# Patient Record
Sex: MALE | Race: WHITE | NOT HISPANIC OR LATINO
[De-identification: names, ages, dates, MRNs, and addresses within clinical notes are randomized per-mention and may not be internally consistent; named-entity substitution may affect disease eponyms.]

---

## 2021-10-18 ENCOUNTER — NON-APPOINTMENT (OUTPATIENT)
Age: 76
End: 2021-10-18

## 2021-10-19 ENCOUNTER — NON-APPOINTMENT (OUTPATIENT)
Age: 76
End: 2021-10-19

## 2021-10-25 ENCOUNTER — EMERGENCY (EMERGENCY)
Facility: HOSPITAL | Age: 76
LOS: 1 days | Discharge: HOME | End: 2021-10-25
Attending: INTERNAL MEDICINE | Admitting: INTERNAL MEDICINE
Payer: COMMERCIAL

## 2021-10-25 ENCOUNTER — APPOINTMENT (OUTPATIENT)
Dept: FAMILY MEDICINE | Facility: CLINIC | Age: 76
End: 2021-10-25
Payer: MEDICARE

## 2021-10-25 VITALS
HEIGHT: 67.5 IN | TEMPERATURE: 96.9 F | OXYGEN SATURATION: 98 % | RESPIRATION RATE: 16 BRPM | DIASTOLIC BLOOD PRESSURE: 90 MMHG | BODY MASS INDEX: 23.73 KG/M2 | SYSTOLIC BLOOD PRESSURE: 130 MMHG | HEART RATE: 68 BPM | WEIGHT: 153 LBS

## 2021-10-25 VITALS
TEMPERATURE: 98 F | HEART RATE: 59 BPM | SYSTOLIC BLOOD PRESSURE: 169 MMHG | RESPIRATION RATE: 17 BRPM | DIASTOLIC BLOOD PRESSURE: 108 MMHG | WEIGHT: 153 LBS | OXYGEN SATURATION: 99 % | HEIGHT: 67 IN

## 2021-10-25 VITALS
OXYGEN SATURATION: 98 % | SYSTOLIC BLOOD PRESSURE: 158 MMHG | HEART RATE: 57 BPM | RESPIRATION RATE: 16 BRPM | DIASTOLIC BLOOD PRESSURE: 85 MMHG

## 2021-10-25 DIAGNOSIS — R63.4 ABNORMAL WEIGHT LOSS: ICD-10-CM

## 2021-10-25 DIAGNOSIS — Z98.890 OTHER SPECIFIED POSTPROCEDURAL STATES: ICD-10-CM

## 2021-10-25 LAB
ACETONE SERPL-MCNC: ABNORMAL
ALBUMIN SERPL ELPH-MCNC: 4 G/DL — SIGNIFICANT CHANGE UP (ref 3.3–5)
ALP SERPL-CCNC: 109 U/L — SIGNIFICANT CHANGE UP (ref 40–120)
ALT FLD-CCNC: 69 U/L — HIGH (ref 10–45)
ANION GAP SERPL CALC-SCNC: 13 MMOL/L — SIGNIFICANT CHANGE UP (ref 5–17)
APPEARANCE UR: CLEAR — SIGNIFICANT CHANGE UP
AST SERPL-CCNC: 47 U/L — HIGH (ref 10–40)
BACTERIA # UR AUTO: NEGATIVE /HPF — SIGNIFICANT CHANGE UP
BASE EXCESS BLDV CALC-SCNC: -0.6 MMOL/L — SIGNIFICANT CHANGE UP (ref -2–3)
BASOPHILS # BLD AUTO: 0.02 K/UL — SIGNIFICANT CHANGE UP (ref 0–0.2)
BASOPHILS NFR BLD AUTO: 0.3 % — SIGNIFICANT CHANGE UP (ref 0–2)
BILIRUB SERPL-MCNC: 0.9 MG/DL — SIGNIFICANT CHANGE UP (ref 0.2–1.2)
BILIRUB UR-MCNC: NEGATIVE — SIGNIFICANT CHANGE UP
BUN SERPL-MCNC: 22 MG/DL — SIGNIFICANT CHANGE UP (ref 7–23)
CALCIUM SERPL-MCNC: 9.6 MG/DL — SIGNIFICANT CHANGE UP (ref 8.4–10.5)
CHLORIDE SERPL-SCNC: 90 MMOL/L — LOW (ref 96–108)
CO2 BLDV-SCNC: 27 MMOL/L — HIGH (ref 22–26)
CO2 SERPL-SCNC: 25 MMOL/L — SIGNIFICANT CHANGE UP (ref 22–31)
COLOR SPEC: YELLOW — SIGNIFICANT CHANGE UP
CREAT SERPL-MCNC: 1.36 MG/DL — HIGH (ref 0.5–1.3)
DIFF PNL FLD: NEGATIVE — SIGNIFICANT CHANGE UP
EOSINOPHIL # BLD AUTO: 0.05 K/UL — SIGNIFICANT CHANGE UP (ref 0–0.5)
EOSINOPHIL NFR BLD AUTO: 0.9 % — SIGNIFICANT CHANGE UP (ref 0–6)
EPI CELLS # UR: SIGNIFICANT CHANGE UP
GAS PNL BLDV: SIGNIFICANT CHANGE UP
GLUCOSE BLDC GLUCOMTR-MCNC: 360 MG/DL — HIGH (ref 70–99)
GLUCOSE BLDC GLUCOMTR-MCNC: 425 MG/DL — HIGH (ref 70–99)
GLUCOSE BLDC GLUCOMTR-MCNC: 445 MG/DL — HIGH (ref 70–99)
GLUCOSE SERPL-MCNC: 427 MG/DL — HIGH (ref 70–99)
GLUCOSE UR QL: 1000 MG/DL
HCO3 BLDV-SCNC: 25 MMOL/L — SIGNIFICANT CHANGE UP (ref 22–29)
HCT VFR BLD CALC: 40.6 % — SIGNIFICANT CHANGE UP (ref 39–50)
HGB BLD-MCNC: 14.8 G/DL — SIGNIFICANT CHANGE UP (ref 13–17)
HOROWITZ INDEX BLDV+IHG-RTO: 21 — SIGNIFICANT CHANGE UP
IMM GRANULOCYTES NFR BLD AUTO: 0.5 % — SIGNIFICANT CHANGE UP (ref 0–1.5)
KETONES UR-MCNC: ABNORMAL
LEUKOCYTE ESTERASE UR-ACNC: NEGATIVE — SIGNIFICANT CHANGE UP
LYMPHOCYTES # BLD AUTO: 1.3 K/UL — SIGNIFICANT CHANGE UP (ref 1–3.3)
LYMPHOCYTES # BLD AUTO: 22.1 % — SIGNIFICANT CHANGE UP (ref 13–44)
MAGNESIUM SERPL-MCNC: 2.1 MG/DL — SIGNIFICANT CHANGE UP (ref 1.6–2.6)
MCHC RBC-ENTMCNC: 31.7 PG — SIGNIFICANT CHANGE UP (ref 27–34)
MCHC RBC-ENTMCNC: 36.5 GM/DL — HIGH (ref 32–36)
MCV RBC AUTO: 86.9 FL — SIGNIFICANT CHANGE UP (ref 80–100)
MONOCYTES # BLD AUTO: 0.45 K/UL — SIGNIFICANT CHANGE UP (ref 0–0.9)
MONOCYTES NFR BLD AUTO: 7.7 % — SIGNIFICANT CHANGE UP (ref 2–14)
NEUTROPHILS # BLD AUTO: 4.02 K/UL — SIGNIFICANT CHANGE UP (ref 1.8–7.4)
NEUTROPHILS NFR BLD AUTO: 68.5 % — SIGNIFICANT CHANGE UP (ref 43–77)
NITRITE UR-MCNC: NEGATIVE — SIGNIFICANT CHANGE UP
NRBC # BLD: 0 /100 WBCS — SIGNIFICANT CHANGE UP (ref 0–0)
PCO2 BLDV: 48 MMHG — SIGNIFICANT CHANGE UP (ref 42–55)
PH BLDV: 7.33 — SIGNIFICANT CHANGE UP (ref 7.32–7.43)
PH UR: 6 — SIGNIFICANT CHANGE UP (ref 5–8)
PHOSPHATE SERPL-MCNC: 4.4 MG/DL — SIGNIFICANT CHANGE UP (ref 2.5–4.5)
PLATELET # BLD AUTO: 202 K/UL — SIGNIFICANT CHANGE UP (ref 150–400)
PO2 BLDV: <35 MMHG — LOW (ref 25–45)
POTASSIUM SERPL-MCNC: 4.6 MMOL/L — SIGNIFICANT CHANGE UP (ref 3.5–5.3)
POTASSIUM SERPL-SCNC: 4.6 MMOL/L — SIGNIFICANT CHANGE UP (ref 3.5–5.3)
PROT SERPL-MCNC: 8.2 G/DL — SIGNIFICANT CHANGE UP (ref 6–8.3)
PROT UR-MCNC: 15
RBC # BLD: 4.67 M/UL — SIGNIFICANT CHANGE UP (ref 4.2–5.8)
RBC # FLD: 12.2 % — SIGNIFICANT CHANGE UP (ref 10.3–14.5)
RBC CASTS # UR COMP ASSIST: NEGATIVE /HPF — SIGNIFICANT CHANGE UP (ref 0–4)
SAO2 % BLDV: 17.1 % — LOW (ref 67–88)
SODIUM SERPL-SCNC: 128 MMOL/L — LOW (ref 135–145)
SP GR SPEC: 1.01 — SIGNIFICANT CHANGE UP (ref 1.01–1.02)
UROBILINOGEN FLD QL: NEGATIVE — SIGNIFICANT CHANGE UP
WBC # BLD: 5.87 K/UL — SIGNIFICANT CHANGE UP (ref 3.8–10.5)
WBC # FLD AUTO: 5.87 K/UL — SIGNIFICANT CHANGE UP (ref 3.8–10.5)
WBC UR QL: NEGATIVE /HPF — SIGNIFICANT CHANGE UP (ref 0–5)

## 2021-10-25 PROCEDURE — 80053 COMPREHEN METABOLIC PANEL: CPT

## 2021-10-25 PROCEDURE — 71045 X-RAY EXAM CHEST 1 VIEW: CPT | Mod: 26

## 2021-10-25 PROCEDURE — 96361 HYDRATE IV INFUSION ADD-ON: CPT

## 2021-10-25 PROCEDURE — 83735 ASSAY OF MAGNESIUM: CPT

## 2021-10-25 PROCEDURE — 82947 ASSAY GLUCOSE BLOOD QUANT: CPT | Mod: QW

## 2021-10-25 PROCEDURE — 36415 COLL VENOUS BLD VENIPUNCTURE: CPT

## 2021-10-25 PROCEDURE — 85025 COMPLETE CBC W/AUTO DIFF WBC: CPT

## 2021-10-25 PROCEDURE — 82962 GLUCOSE BLOOD TEST: CPT

## 2021-10-25 PROCEDURE — 82803 BLOOD GASES ANY COMBINATION: CPT

## 2021-10-25 PROCEDURE — 99283 EMERGENCY DEPT VISIT LOW MDM: CPT | Mod: 25

## 2021-10-25 PROCEDURE — 99284 EMERGENCY DEPT VISIT MOD MDM: CPT

## 2021-10-25 PROCEDURE — 84100 ASSAY OF PHOSPHORUS: CPT

## 2021-10-25 PROCEDURE — 71045 X-RAY EXAM CHEST 1 VIEW: CPT

## 2021-10-25 PROCEDURE — 93010 ELECTROCARDIOGRAM REPORT: CPT

## 2021-10-25 PROCEDURE — 81003 URINALYSIS AUTO W/O SCOPE: CPT | Mod: QW

## 2021-10-25 PROCEDURE — 83036 HEMOGLOBIN GLYCOSYLATED A1C: CPT

## 2021-10-25 PROCEDURE — 82009 KETONE BODYS QUAL: CPT

## 2021-10-25 PROCEDURE — 99205 OFFICE O/P NEW HI 60 MIN: CPT | Mod: 25

## 2021-10-25 PROCEDURE — 81001 URINALYSIS AUTO W/SCOPE: CPT

## 2021-10-25 PROCEDURE — 96360 HYDRATION IV INFUSION INIT: CPT

## 2021-10-25 PROCEDURE — 93005 ELECTROCARDIOGRAM TRACING: CPT

## 2021-10-25 PROCEDURE — 83036 HEMOGLOBIN GLYCOSYLATED A1C: CPT | Mod: QW

## 2021-10-25 RX ORDER — SODIUM CHLORIDE 9 MG/ML
1000 INJECTION INTRAMUSCULAR; INTRAVENOUS; SUBCUTANEOUS ONCE
Refills: 0 | Status: COMPLETED | OUTPATIENT
Start: 2021-10-25 | End: 2021-10-25

## 2021-10-25 RX ORDER — INSULIN HUMAN 100 [IU]/ML
100 INJECTION, SOLUTION PARENTERAL ONCE
Qty: 1 | Refills: 0 | Status: DISCONTINUED | COMMUNITY
Start: 2021-10-25 | End: 2021-10-25

## 2021-10-25 RX ORDER — INSULIN HUMAN 100 [IU]/ML
5 INJECTION, SOLUTION SUBCUTANEOUS ONCE
Refills: 0 | Status: COMPLETED | OUTPATIENT
Start: 2021-10-25 | End: 2021-10-25

## 2021-10-25 RX ADMIN — SODIUM CHLORIDE 1000 MILLILITER(S): 9 INJECTION INTRAMUSCULAR; INTRAVENOUS; SUBCUTANEOUS at 14:28

## 2021-10-25 RX ADMIN — SODIUM CHLORIDE 1000 MILLILITER(S): 9 INJECTION INTRAMUSCULAR; INTRAVENOUS; SUBCUTANEOUS at 15:19

## 2021-10-25 RX ADMIN — SODIUM CHLORIDE 1000 MILLILITER(S): 9 INJECTION INTRAMUSCULAR; INTRAVENOUS; SUBCUTANEOUS at 16:24

## 2021-10-25 RX ADMIN — SODIUM CHLORIDE 1000 MILLILITER(S): 9 INJECTION INTRAMUSCULAR; INTRAVENOUS; SUBCUTANEOUS at 17:39

## 2021-10-25 RX ADMIN — INSULIN HUMAN 5 UNIT(S): 100 INJECTION, SOLUTION SUBCUTANEOUS at 17:39

## 2021-10-25 RX ADMIN — INSULIN HUMAN 5 UNIT(S): 100 INJECTION, SOLUTION SUBCUTANEOUS at 15:20

## 2021-10-25 NOTE — ASSESSMENT
[FreeTextEntry1] : 77 yo M presents to establish care and has new diagnosis of diabetes. \par \par #new diagnosed diabetes\par reported increased urinary frequency and DM in family \par Glucose fingerstick in clinic was 464\par POCT A1C was over 14%\par Will hydrate, and repeat fingerstick \par seen by diabetes educator and kentrell sensor was placed to monitor glucose \par will initiate insulin -10 units nightly and add 2 units daily until fasting glucose below 150\par Sent blood work to check kidney level and pending results will initiate metformin 500 BID and uptitrate as tolerated\par insulin administration teaching was provided by diabetes educator\par \par UPDATE: - it was elevated again at 453, and ketones found in urine\par will need monitoring to assess potassium as well. \par Will send patient to ED, and patient is amenable to plan for closer monitoring.\par \par #reactive depression\par wife is in hospice care a home\par PHQ9 score 14\par feels safe, no SI\par SW referral placed

## 2021-10-25 NOTE — HEALTH RISK ASSESSMENT
[No] : In the past 12 months have you used drugs other than those required for medical reasons? No [3] : 2) Feeling down, depressed, or hopeless for nearly every day (3) [[id number]] : 6

## 2021-10-25 NOTE — ED PROVIDER NOTE - CROS ED ENDOCRINE ALL NEG
----- Message from Myesha Robles MD sent at 3/12/2018 12:48 PM CDT -----  To keep FUV scheduled for tomorrow (he is way overdue). Increase synthroid to 100mcg daily, must recheck TSH in 12 weeks. MAT   - - -

## 2021-10-25 NOTE — ED PROVIDER NOTE - OBJECTIVE STATEMENT
75 y/o M no pmh [facility] from [facility] for 's and HGA1C 14. States polyuria and polydipsia. Has not seen a doctor in 2 years. His identical twin brother does have DM and is on insulin and oral meds. Denies fever, chills, chest pain, shortness of breath, ab pain, n/v/d, weakness, numbness, tingling.  Denies smoking/ ETOH.

## 2021-10-25 NOTE — ED PROVIDER NOTE - ATTENDING CONTRIBUTION TO CARE
75 y/o M no pmh sent in from [facility] for 's and HGA1C 14. States polyuria and polydipsia. Has not seen a doctor in 2 years. His identical twin brother does have DM and is on insulin and oral meds. Denies fever, chills, chest pain, shortness of breath, ab pain, n/v/d, weakness, numbness, tingling.  Denies smoking/ ETOH.   Plan: Will obtain DKA work up, UA, give fluids, insulin and likely admit  trace serum acetones, glucose  427, bicarb 13, ph 7.33  bun/cr 22/1.39   Dr. Berrios:  I have reviewed and discussed with the PA/ resident the case specifics, including the history, physical assessment, evaluation, conclusion, laboratory results, and medical plan. I agree with the contents, and conclusions. I have personally examined, and interviewed the patient. 75 y/o M no pmh sent in from [facility] for 's and HGA1C 14. States polyuria and polydipsia. Has not seen a doctor in 2 years. His identical twin brother does have DM and is on insulin and oral meds. Denies fever, chills, chest pain, shortness of breath, ab pain, n/v/d, weakness, numbness, tingling.  Denies smoking/ ETOH.   Plan: Will obtain DKA work up, UA, give fluids, insulin and likely admit  trace serum acetones, glucose  427, bicarb 13, ph 7.33  bun/cr 22/1.39    Long acting insulin Basaglar already sent to pharm by pmd   appoint  for dr ambrosio endocrine aLREADY MADE  Dr. Berrios:  I have reviewed and discussed with the PA/ resident the case specifics, including the history, physical assessment, evaluation, conclusion, laboratory results, and medical plan. I agree with the contents, and conclusions. I have personally examined, and interviewed the patient.

## 2021-10-25 NOTE — ED PROVIDER NOTE - CARE PROVIDER_API CALL
Chris Reece)  EndocrinologyMetabDiabetes  [address], [address]  [place] [address]  Phone: [phone]  Fax: [phone]  Follow Up Time:

## 2021-10-25 NOTE — ED ADULT TRIAGE NOTE - CHIEF COMPLAINT QUOTE
Pt from [facility], brought in for elevated  mg/dl at 1230 pm Pt from [facility], brought in for elevated  mg/dl at 1230 pm, ISAR negative

## 2021-10-25 NOTE — ED PROVIDER NOTE - NSFOLLOWUPINSTRUCTIONS_ED_ALL_ED_FT
Follow up with Dr. Reece within the week  Monitor your blood glucose at home before meals and at bedtime and record in a log book  Take Metformin 500mg 1 tab 2x/day   Take Glyburide 2.5mg 1 tab 2x/day  Take the Insulin at night as instructed by Dr. Reece who already sent it to pharmacy.   Worsening, continued or ANY new concerning symptoms return to the emergency department.     Diabetes Mellitus Action Plan    Following a diabetes action plan is a way for you to manage your diabetes (diabetes mellitus) symptoms. The plan is color-coded to help you understand what actions you need to take based on any symptoms you are having.  •If you have symptoms in the red zone, you need medical care right away.      •If you have symptoms in the yellow zone, you are having problems.      •If you have symptoms in the green zone, you are doing well.      Learning about and understanding diabetes can take time. Follow the plan that you develop with your health care provider. Know the target range for your blood sugar (glucose) level, and review your treatment plan with your health care provider at each visit.    The target range for my blood sugar level is __________________________ mg/dL.      Red zone  Get medical help right away if you have any of the following symptoms:  •A blood sugar test result that is below 54 mg/dL (3 mmol/L).      •A blood sugar test result that is at or above 240 mg/dL (13.3 mmol/L) for 2 days in a row.      •Confusion or trouble thinking clearly.      •Difficulty breathing.      •Sickness or a fever for 2 or more days that is not getting better.      •Moderate or large ketone levels in your urine.      •Feeling tired or having no energy.      If you have any red zone symptoms, do not wait to see if the symptoms will go away. Get medical help right away. Call your local emergency services (911 in the [place] Do not drive yourself to the hospital.    If you have severely low blood sugar (severe hypoglycemia) and you cannot eat or drink, you may need glucagon. Make sure a family member or close friend knows how to check your blood sugar and how to give you glucagon. You may need to be treated in a hospital for this condition.      Yellow zone  If you have any of the following symptoms, your diabetes is not under control and you may need to make some changes:  •A blood sugar test result that is at or above 240 mg/dL (13.3 mmol/L) for 2 days in a row.      •Blood sugar test results that are below 70 mg/dL (3.9 mmol/L).    •Other symptoms of hypoglycemia, such as:  •Shaking or feeling light-headed.      •Confusion or irritability.      •Feeling hungry.      •Having a fast heartbeat.      If you have any yellow zone symptoms:•Treat your hypoglycemia by eating or drinking 15 grams of a rapid-acting carbohydrate. Follow the 15:15 rule:•Take 15 grams of a rapid-acting carbohydrate, such as:  •1 tube of glucose gel.      •4 glucose pills.      •4 oz (120 mL) of fruit juice.      •4 oz (120 mL) of regular (not diet) soda.        •Check your blood sugar 15 minutes after you take the carbohydrate.      •If the repeat blood sugar test is still at or below 70 mg/dL (3.9 mmol/L), take 15 grams of a carbohydrate again.      •If your blood sugar does not increase above 70 mg/dL (3.9 mmol/L) after 3 tries, get medical help right away.      •After your blood sugar returns to normal, eat a meal or a snack within 1 hour.        •Keep taking your daily medicines as told by your health care provider.    •Check your blood sugar more often than you normally would.  •Write down your results.      •Call your health care provider if you have trouble keeping your blood sugar in your target range.          Green zone  These signs mean you are doing well and you can continue what you are doing to manage your diabetes:  •Your blood sugar is within your personal target range. For most people, a blood sugar level before a meal (preprandial) should be 80–130 mg/dL (4.4–7.2 mmol/L).      •You feel well, and you are able to do daily activities.    If you are in the green zone, continue to manage your diabetes as told by your health care provider. To do this:  •Eat a healthy diet.      •Exercise regularly.      •Check your blood sugar as told by your health care provider.      •Take your medicines as told by your health care provider.        Where to find more information    •American Diabetes Association (ADA): diabetes.org      •Association of Diabetes Care & Education Specialists (ADCES): diabeteseducator.org        Summary    •Following a diabetes action plan is a way for you to manage your diabetes symptoms. The plan is color-coded to help you understand what actions you need to take based on any symptoms you are having.      •Follow the plan that you develop with your health care provider. Make sure you know your personal target blood sugar level.      •Review your treatment plan with your health care provider at each visit.      This information is not intended to replace advice given to you by your health care provider. Make sure you discuss any questions you have with your health care provider. Follow up with Dr. Reece this Thursday 10/28/21 at 3:45pm  Monitor your blood glucose at home before meals and at bedtime and record in a log book  Take Metformin 500mg 1 tab 2x/day   Take Glyburide 2.5mg 1 tab 2x/day  Take the Insulin at night as instructed by Dr. Reece who already sent it to pharmacy.   Worsening, continued or ANY new concerning symptoms return to the emergency department.     Diabetes Mellitus Action Plan    Following a diabetes action plan is a way for you to manage your diabetes (diabetes mellitus) symptoms. The plan is color-coded to help you understand what actions you need to take based on any symptoms you are having.  •If you have symptoms in the red zone, you need medical care right away.      •If you have symptoms in the yellow zone, you are having problems.      •If you have symptoms in the green zone, you are doing well.      Learning about and understanding diabetes can take time. Follow the plan that you develop with your health care provider. Know the target range for your blood sugar (glucose) level, and review your treatment plan with your health care provider at each visit.    The target range for my blood sugar level is __________________________ mg/dL.      Red zone  Get medical help right away if you have any of the following symptoms:  •A blood sugar test result that is below 54 mg/dL (3 mmol/L).      •A blood sugar test result that is at or above 240 mg/dL (13.3 mmol/L) for 2 days in a row.      •Confusion or trouble thinking clearly.      •Difficulty breathing.      •Sickness or a fever for 2 or more days that is not getting better.      •Moderate or large ketone levels in your urine.      •Feeling tired or having no energy.      If you have any red zone symptoms, do not wait to see if the symptoms will go away. Get medical help right away. Call your local emergency services (911 in the [place] Do not drive yourself to the hospital.    If you have severely low blood sugar (severe hypoglycemia) and you cannot eat or drink, you may need glucagon. Make sure a family member or close friend knows how to check your blood sugar and how to give you glucagon. You may need to be treated in a hospital for this condition.      Yellow zone  If you have any of the following symptoms, your diabetes is not under control and you may need to make some changes:  •A blood sugar test result that is at or above 240 mg/dL (13.3 mmol/L) for 2 days in a row.      •Blood sugar test results that are below 70 mg/dL (3.9 mmol/L).    •Other symptoms of hypoglycemia, such as:  •Shaking or feeling light-headed.      •Confusion or irritability.      •Feeling hungry.      •Having a fast heartbeat.      If you have any yellow zone symptoms:•Treat your hypoglycemia by eating or drinking 15 grams of a rapid-acting carbohydrate. Follow the 15:15 rule:•Take 15 grams of a rapid-acting carbohydrate, such as:  •1 tube of glucose gel.      •4 glucose pills.      •4 oz (120 mL) of fruit juice.      •4 oz (120 mL) of regular (not diet) soda.        •Check your blood sugar 15 minutes after you take the carbohydrate.      •If the repeat blood sugar test is still at or below 70 mg/dL (3.9 mmol/L), take 15 grams of a carbohydrate again.      •If your blood sugar does not increase above 70 mg/dL (3.9 mmol/L) after 3 tries, get medical help right away.      •After your blood sugar returns to normal, eat a meal or a snack within 1 hour.        •Keep taking your daily medicines as told by your health care provider.    •Check your blood sugar more often than you normally would.  •Write down your results.      •Call your health care provider if you have trouble keeping your blood sugar in your target range.          Green zone  These signs mean you are doing well and you can continue what you are doing to manage your diabetes:  •Your blood sugar is within your personal target range. For most people, a blood sugar level before a meal (preprandial) should be 80–130 mg/dL (4.4–7.2 mmol/L).      •You feel well, and you are able to do daily activities.    If you are in the green zone, continue to manage your diabetes as told by your health care provider. To do this:  •Eat a healthy diet.      •Exercise regularly.      •Check your blood sugar as told by your health care provider.      •Take your medicines as told by your health care provider.        Where to find more information    •American Diabetes Association (ADA): diabetes.org      •Association of Diabetes Care & Education Specialists (ADCES): diabeteseducator.org        Summary    •Following a diabetes action plan is a way for you to manage your diabetes symptoms. The plan is color-coded to help you understand what actions you need to take based on any symptoms you are having.      •Follow the plan that you develop with your health care provider. Make sure you know your personal target blood sugar level.      •Review your treatment plan with your health care provider at each visit.      This information is not intended to replace advice given to you by your health care provider. Make sure you discuss any questions you have with your health care provider.

## 2021-10-25 NOTE — REVIEW OF SYSTEMS
[Fatigue] : fatigue [Recent Change In Weight] : ~T recent weight change [Insomnia] : insomnia [Depression] : depression [Fever] : no fever [Chills] : no chills [Night Sweats] : no night sweats [Discharge] : no discharge [Pain] : no pain [Vision Problems] : no vision problems [Itching] : no itching [Earache] : no earache [Hearing Loss] : no hearing loss [Nasal Discharge] : no nasal discharge [Sore Throat] : no sore throat [Chest Pain] : no chest pain [Palpitations] : no palpitations [Lower Ext Edema] : no lower extremity edema [Orthopena] : no orthopnea [Shortness Of Breath] : no shortness of breath [Wheezing] : no wheezing [Cough] : no cough [Dyspnea on Exertion] : not dyspnea on exertion [Abdominal Pain] : no abdominal pain [Nausea] : no nausea [Constipation] : no constipation [Diarrhea] : no diarrhea [Vomiting] : no vomiting [Heartburn] : no heartburn [Joint Pain] : no joint pain [Back Pain] : no back pain [Itching] : no itching [Mole Changes] : no mole changes [Headache] : no headache [Dizziness] : no dizziness [Suicidal] : not suicidal

## 2021-10-25 NOTE — PHYSICAL EXAM
[Normal Voice/Communication] : normal voice/communication [Normal Sclera/Conjunctiva] : normal sclera/conjunctiva [PERRL] : pupils equal round and reactive to light [EOMI] : extraocular movements intact [Normal Outer Ear/Nose] : the outer ears and nose were normal in appearance [Normal Oropharynx] : the oropharynx was normal [No JVD] : no jugular venous distention [No Lymphadenopathy] : no lymphadenopathy [No Respiratory Distress] : no respiratory distress  [No Accessory Muscle Use] : no accessory muscle use [Clear to Auscultation] : lungs were clear to auscultation bilaterally [Normal Rate] : normal rate  [Regular Rhythm] : with a regular rhythm [Normal S1, S2] : normal S1 and S2 [No Edema] : there was no peripheral edema [Soft] : abdomen soft [Non Tender] : non-tender [Non-distended] : non-distended [No HSM] : no HSM [No CVA Tenderness] : no CVA  tenderness [No Spinal Tenderness] : no spinal tenderness [No Joint Swelling] : no joint swelling [No Focal Deficits] : no focal deficits [2+] : left 2+ [de-identified] : Teary at times when discussing his wife's health condition (currently in hospice)

## 2021-10-25 NOTE — HISTORY OF PRESENT ILLNESS
[FreeTextEntry8] : 75 yo M presents to establish care.\par \par #reactive depression\par Patient reports decreased appetite and not eating \par 20 lbs weight loss in the last 6-8 weeks\par \par stays by wife's side who is in hospice care a home\par 50 year anniversary this week \par reports support from family members \par feels safe no SI\par \par #increased urinary frequency \par he increased water intake and now has notice increased urination \par almost hourly at times particularly at night \par no dysuria or straining\par no constipation\par family history of diabetes, including twin brother\par not eating well, not healthy and not well balanced\par was drinking increased Gatorade amounts at first and recently switched to water \par \par #healthcare maintenance\par colonoscopy 4-5 years ago was normal per pt and is due for colonoscopy 2022\par received COVID vaccine and influenza vaccine\par \par \par

## 2021-10-25 NOTE — ED PROVIDER NOTE - CLINICAL SUMMARY MEDICAL DECISION MAKING FREE TEXT BOX
77 y/o M no pmh sent in from [facility] for 's and HGA1C 14. States polyuria and polydipsia. Has not seen a doctor in 2 years. His identical twin brother does have DM and is on insulin and oral meds. Denies fever, chills, chest pain, shortness of breath, ab pain, n/v/d, weakness, numbness, tingling.  Denies smoking/ ETOH.   Plan: Will obtain DKA work up, UA, give fluids, insulin and likely admit 77 y/o M no pmh sent in from [facility] for 's and HGA1C 14. States polyuria and polydipsia. Has not seen a doctor in 2 years. His identical twin brother does have DM and is on insulin and oral meds. Denies fever, chills, chest pain, shortness of breath, ab pain, n/v/d, weakness, numbness, tingling.  Denies smoking/ ETOH.   Plan: Will obtain DKA work up, UA, give fluids, insulin and likely admit  **update: MONIE Te[facility]eda- [place] [facility]. Ph 7.3 not acidodic. Case d/w dr Reece endocrinology [phone]  pt insists on discharge- wife on hospice at home with minimal time to live. Endo recommends DC on  glipizide 2.5 mg bid and metformin 500 mg bid. Long acting insulin Basaglar already sent to pharm 75 y/o M no pmh sent in from [facility] for 's and HGA1C 14. States polyuria and polydipsia. Has not seen a doctor in 2 years. His identical twin brother does have DM and is on insulin and oral meds. Denies fever, chills, chest pain, shortness of breath, ab pain, n/v/d, weakness, numbness, tingling.  Denies smoking/ ETOH.   Plan: Will obtain DKA work up, UA, give fluids, insulin and likely admit  **update: MONIE [facility]eda- [place] [facility]. Ph 7.3 not acidodic. Case d/w dr Reece endocrinology [phone]  pt insists on discharge- wife on hospice at home with minimal time to live. Monalisa recommends DC on  glipizide 2.5 mg bid and metformin 500 mg bid. Long acting insulin Basaglar already sent to pharm  **Social work made appt with Monalisa Thursday 3:45pm!

## 2021-10-25 NOTE — ED PROVIDER NOTE - PATIENT PORTAL LINK FT
You can access the FollowMyHealth Patient Portal offered by [facility] by registering at the following website: [email or web address]. By joining [facility]’s FollowMyHealth portal, you will also be able to view your health information using other applications (apps) compatible with our system.

## 2021-10-25 NOTE — ED PROVIDER NOTE - PROGRESS NOTE DETAILS
pt stable case d/w dr swartz endocrinology [phone]  pt insists on discharge he recommended upon dc dc pt with glipizide 2.5 mg bid  metformin 500 mg bid MONIE Te[facility]eda- [place] [facility]. Ph 7.3 not acidodic. Case d/w dr Gibbs endocrinology [phone]  pt insists on discharge- wife on hospice at home with minimal time to live. Endo recommends DC on  glipizide 2.5 mg bid and metformin 500 mg bid. Long acting insulin Basaglar already sent to pharm

## 2021-10-26 DIAGNOSIS — E11.9 TYPE 2 DIABETES MELLITUS W/OUT COMPLICATIONS: ICD-10-CM

## 2021-10-26 LAB
A1C WITH ESTIMATED AVERAGE GLUCOSE RESULT: 14.7 % — HIGH (ref 4–5.6)
BILIRUB UR QL STRIP: NORMAL
CLARITY UR: CLEAR
COLLECTION METHOD: NORMAL
CREAT SPEC-SCNC: 61 MG/DL
ESTIMATED AVERAGE GLUCOSE: 375 MG/DL — HIGH (ref 68–114)
GLUCOSE BLDC GLUCOMTR-MCNC: 453
GLUCOSE BLDC GLUCOMTR-MCNC: 464
GLUCOSE UR-MCNC: 500
HBA1C MFR BLD HPLC: 14
HCG UR QL: 0.2 EU/DL
HGB UR QL STRIP.AUTO: NORMAL
KETONES UR-MCNC: 40
LEUKOCYTE ESTERASE UR QL STRIP: NORMAL
MICROALBUMIN 24H UR DL<=1MG/L-MCNC: 9.7 MG/DL
MICROALBUMIN/CREAT 24H UR-RTO: 158 MG/G
NITRITE UR QL STRIP: NORMAL
PH UR STRIP: 5.5
PROT UR STRIP-MCNC: 30
SP GR UR STRIP: 1.01

## 2021-10-26 RX ORDER — BLOOD SUGAR DIAGNOSTIC
STRIP MISCELLANEOUS
Qty: 2 | Refills: 2 | Status: ACTIVE | COMMUNITY
Start: 2021-10-25 | End: 1900-01-01

## 2021-10-26 RX ORDER — PEN NEEDLE, DIABETIC 29 G X1/2"
32G X 4 MM NEEDLE, DISPOSABLE MISCELLANEOUS
Qty: 1 | Refills: 3 | Status: ACTIVE | COMMUNITY
Start: 2021-10-25 | End: 1900-01-01

## 2021-10-26 RX ORDER — LANCETS 30 GAUGE
EACH MISCELLANEOUS
Qty: 1 | Refills: 4 | Status: ACTIVE | COMMUNITY
Start: 2021-10-25 | End: 1900-01-01

## 2021-10-29 ENCOUNTER — NON-APPOINTMENT (OUTPATIENT)
Age: 76
End: 2021-10-29

## 2021-10-29 LAB
25(OH)D3 SERPL-MCNC: 30.1 NG/ML
ALBUMIN SERPL ELPH-MCNC: 4.2 G/DL
ALP BLD-CCNC: 74 U/L
ALT SERPL-CCNC: 46 U/L
ANION GAP SERPL CALC-SCNC: 16 MMOL/L
AST SERPL-CCNC: 33 U/L
BASOPHILS # BLD AUTO: 0.03 K/UL
BASOPHILS NFR BLD AUTO: 0.6 %
BILIRUB SERPL-MCNC: 0.5 MG/DL
BUN SERPL-MCNC: 19 MG/DL
CALCIUM SERPL-MCNC: 9.7 MG/DL
CHLORIDE SERPL-SCNC: 104 MMOL/L
CHOLEST SERPL-MCNC: 198 MG/DL
CO2 SERPL-SCNC: 20 MMOL/L
CREAT SERPL-MCNC: 1.15 MG/DL
EOSINOPHIL # BLD AUTO: 0.11 K/UL
EOSINOPHIL NFR BLD AUTO: 2.1 %
ESTIMATED AVERAGE GLUCOSE: 358 MG/DL
FOLATE SERPL-MCNC: 6.8 NG/ML
GLUCOSE SERPL-MCNC: 183 MG/DL
HBA1C MFR BLD HPLC: 14.1 %
HCT VFR BLD CALC: 39.9 %
HDLC SERPL-MCNC: 47 MG/DL
HGB BLD-MCNC: 14 G/DL
IMM GRANULOCYTES NFR BLD AUTO: 0.4 %
LDLC SERPL CALC-MCNC: 119 MG/DL
LYMPHOCYTES # BLD AUTO: 1.79 K/UL
LYMPHOCYTES NFR BLD AUTO: 34.7 %
MAN DIFF?: NORMAL
MCHC RBC-ENTMCNC: 31 PG
MCHC RBC-ENTMCNC: 35.1 GM/DL
MCV RBC AUTO: 88.5 FL
MONOCYTES # BLD AUTO: 0.63 K/UL
MONOCYTES NFR BLD AUTO: 12.2 %
NEUTROPHILS # BLD AUTO: 2.58 K/UL
NEUTROPHILS NFR BLD AUTO: 50 %
NONHDLC SERPL-MCNC: 151 MG/DL
PLATELET # BLD AUTO: 217 K/UL
POTASSIUM SERPL-SCNC: 4.2 MMOL/L
PROT SERPL-MCNC: 6.7 G/DL
RBC # BLD: 4.51 M/UL
RBC # FLD: 13.3 %
SODIUM SERPL-SCNC: 140 MMOL/L
TRIGL SERPL-MCNC: 160 MG/DL
TSH SERPL-ACNC: 1.93 UIU/ML
VIT B12 SERPL-MCNC: 928 PG/ML
WBC # FLD AUTO: 5.16 K/UL

## 2021-10-29 RX ORDER — INSULIN GLARGINE 100 [IU]/ML
100 INJECTION, SOLUTION SUBCUTANEOUS
Qty: 4 | Refills: 3 | Status: COMPLETED | COMMUNITY
Start: 2021-10-25 | End: 2021-10-29

## 2021-10-29 NOTE — CHART NOTE - NSCHARTNOTEFT_GEN_A_CORE
SW called patient to discuss follow up care.  Patient presented to ED on 10/25/21 for low blood sugar.  Patient did not answer.    In ED, Pt in need of endocrinologist, appointment scheduled with Dr. Reece for Thursday October 28th at 3:45pm. Patient also had PMD appointment scheduled for 11/1/21.
SW met with pt at bedside to access for social work needs, daughter-in-law present as well. Pt presented to ED following appt with Dr. Nettles for further follow up for hyperglycemia. Pt resides in pvt home with spouse, 2 samantha and full flight to bedroom with railings. He is independent with ambulation and ADLs, denies use of any DME or services. Pt reports no recent falls. Pt in need of endocrinologist, appointment scheduled with Dr. Reece for Thursday October 28th at 3:45pm. Pt in agreement with appointment. Pt declines need for further assistance or need for resources.   Pt Cell: [phone]  Ceferino Serrano cell: [phone]

## 2021-11-01 ENCOUNTER — APPOINTMENT (OUTPATIENT)
Dept: FAMILY MEDICINE | Facility: CLINIC | Age: 76
End: 2021-11-01
Payer: COMMERCIAL

## 2021-11-01 VITALS
HEART RATE: 59 BPM | BODY MASS INDEX: 23.96 KG/M2 | TEMPERATURE: 96 F | OXYGEN SATURATION: 98 % | RESPIRATION RATE: 14 BRPM | DIASTOLIC BLOOD PRESSURE: 68 MMHG | SYSTOLIC BLOOD PRESSURE: 110 MMHG | WEIGHT: 155.25 LBS

## 2021-11-01 DIAGNOSIS — Z87.898 PERSONAL HISTORY OF OTHER SPECIFIED CONDITIONS: ICD-10-CM

## 2021-11-01 PROBLEM — Z78.9 OTHER SPECIFIED HEALTH STATUS: Chronic | Status: ACTIVE | Noted: 2021-10-25

## 2021-11-01 PROCEDURE — 95251 CONT GLUC MNTR ANALYSIS I&R: CPT

## 2021-11-01 PROCEDURE — 95250 CONT GLUC MNTR PHYS/QHP EQP: CPT

## 2021-11-01 PROCEDURE — 99213 OFFICE O/P EST LOW 20 MIN: CPT

## 2021-11-01 PROCEDURE — 99215 OFFICE O/P EST HI 40 MIN: CPT | Mod: 25

## 2021-11-01 NOTE — DATA REVIEWED
[FreeTextEntry1] : reviewed CBC, CMP, A1C, lipid panel , TSH, vitamin levels and all labs prior to visit and reviewed all results with patient during visit.

## 2021-11-01 NOTE — REVIEW OF SYSTEMS
[Depression] : depression [Fever] : no fever [Night Sweats] : no night sweats [Discharge] : no discharge [Chest Pain] : no chest pain [Palpitations] : no palpitations [Lower Ext Edema] : no lower extremity edema [Abdominal Pain] : no abdominal pain [Nausea] : no nausea [Constipation] : no constipation [Diarrhea] : no diarrhea [Dysuria] : no dysuria [Incontinence] : no incontinence [Hesitancy] : no hesitancy [Nocturia] : no nocturia [Hematuria] : no hematuria [Frequency] : no frequency [Headache] : no headache [Dizziness] : no dizziness [Fainting] : no fainting [Suicidal] : not suicidal

## 2021-11-01 NOTE — PHYSICAL EXAM
[Alert] : alert [No Acute Distress] : no acute distress [No Respiratory Distress] : no respiratory distress [Right foot was examined, including] : right foot ~C was examined, including visual inspection with sensory and pulse exams [Left foot was examined, including] : left foot ~C was examined, including visual inspection with sensory and pulse exams [2+] : 2+ in the dorsalis pedis [Oriented x3] : oriented to person, place, and time [Normal Affect] : the affect was normal [Recent Memory Normal] : recent memory was not impaired [Normal Insight/Judgement] : insight and judgment were intact [Normal Mood] : the mood was normal [Remote Memory Normal] : remote memory was not impaired [Foot Ulcers] : no foot ulcers [Swelling] : not swollen [Diminished Throughout Both Feet] : normal tactile sensation with monofilament testing throughout both feet [de-identified] : TOE NAILS VERY TINY, WITH SOME BLOOD NOTED.  PT STATES HE PICKS AT HIS TOENAILS DUE TO A NERVOUS HABIT

## 2021-11-01 NOTE — PHYSICAL EXAM
[No Acute Distress] : no acute distress [Well Nourished] : well nourished [Well Developed] : well developed [Well-Appearing] : well-appearing [Normal Sclera/Conjunctiva] : normal sclera/conjunctiva [EOMI] : extraocular movements intact [Normal Outer Ear/Nose] : the outer ears and nose were normal in appearance [No Respiratory Distress] : no respiratory distress  [No Accessory Muscle Use] : no accessory muscle use [Clear to Auscultation] : lungs were clear to auscultation bilaterally [Normal Rate] : normal rate  [Regular Rhythm] : with a regular rhythm [Normal S1, S2] : normal S1 and S2 [No Edema] : there was no peripheral edema [No Focal Deficits] : no focal deficits [Alert and Oriented x3] : oriented to person, place, and time [Normal Insight/Judgement] : insight and judgment were intact

## 2021-11-01 NOTE — HISTORY OF PRESENT ILLNESS
[FreeTextEntry1] : 75 yo M with newly diagnosed uncontrolled diabetes and hyperlipidemia presents for a follow-up visit.  [de-identified] : 77 yo M with newly diagnosed diabetes (a1c 14) and hyperlipidemia presents for a follow-up visit.\par \par #new diagnosed diabetes\par a1c 14%, started on insulin 10/25, and kentrell sensor was placed\par received IV fluids and insulin at recent ED visit\par current regimen includes metformin 500 BID and can be uptitrated to 1000 BID as tolerated. \par reported some mild stomach discomfort with metformin but has been manageable.\par Lantus 13 units in the PM, he will increase to 1000 mg metformin at bedtime. \par He has greatly changed his diet removing many carbohydrates. \par He reported eating fish, grilled chicken, egg, salad with olive oil \par This morning had some cheerios cereal for breakfast, his FSG afterwards was above 250 \par \par He met with the diabetes educator and received information to plan healthy meals\par He feels better and his urinary frequency problem resolved. \par \par No chest pain, headache, or dizziness. \par Denies any hypoglycemic episodes. \par \par #hyperlipidemia\par elevated triglycerides and LDL and non-HDL cholesterol\par \par # depression\par wife is in hospice care at home, and he is taking it day by day \par \par

## 2021-11-01 NOTE — HISTORY OF PRESENT ILLNESS
[FreeTextEntry1] : HERE TODAY FOR DIABETES FOLLOW UP\par FEELING BETTWE\par DECREASED POLYURIA, POLYDIPSIA\par SLEEPING POORLY.  SPOUSE ON HOSPICE\par CGM DOWNLOAD\par BG AVERAGE 218\par WORN FOR 8 DAYS\par BG    \par         < 54         0%\par          54-69      0%\par              38%\par         181-250   32%\par          > 250       30%\par GLUCOSE VARIABILITY 31.3% (TARGET LESS THAN OR EQUAL TO 36%) \par NO READINGS < 70 OR SIGNS AND SYMPTOMS OF HYPOGLYCEMIA \par REPORTS COMPLIANCE W/ LANTUS 12 UNITS  QHS \par .REPORTS COMPLIANCE W/ METFORMIN 500 MG BEFORE BREAKFAST AND DINNER \par IMPROVED READINGS SINCE STARTING METFORMIN\par EATING REASONABLE DIET\par

## 2021-11-01 NOTE — REVIEW OF SYSTEMS
[Fatigue] : fatigue [Decreased Appetite] : appetite not decreased [Blurred Vision] : no blurred vision [Chest Pain] : no chest pain [Shortness Of Breath] : no shortness of breath [Nausea] : no nausea [Diarrhea] : no diarrhea [Gas/Bloating] : no gas/bloating [Polyuria] : no polyuria [Polydipsia] : no polydipsia

## 2021-11-01 NOTE — ASSESSMENT
[FreeTextEntry1] : 75 yo M with newly diagnosed diabetes (a1c 14) and hyperlipidemia presents for a follow-up visit.\par \par #new diagnosed diabetes\par a1c 14%, started on insulin 10/25, and kentrell sensor was placed\par received IV fluids and insulin at recent ED visit\par current regimen includes metformin 500 BID and evening will be increased from 500 to 1000mg \par Lantus 13 units in the PM will remain the same.\par We reviewed FSG readings on sensor and almost all were below 250 range and many below 200. \par \par He has greatly changed his diet removing many carbohydrates. \par He met with our diabetes educator and received information on how to titrate insulin and  plan healthy meals.\par He feels better and his urinary frequency problem resolved. \par \par No chest pain, headache, or dizziness. \par Denies any hypoglycemic episodes. \par We can repeat a1c in 3 months to monitor improvement\par opthalmology referral for diabetic eye exam included\par \par Patient will return in about 1 week to remove kentrell sensor and will bring in vaccine records.\par up to date on influenza vaccine.\par \par #hyperlipidemia\par elevated triglycerides and LDL and non-HDL cholesterol\par and reviewed foods to look out for and we can then repeat in 3 months \par \par #depression\par wife is in hospice care at home, and he is taking it day by day \par advised to seek help if he feels his mood worsens or feels unsafe\par \par #healthcare maintenance\par reviewed CBC, CMP, A1C, lipid panel , TSH, vitamin levels and all labs with patient during visit. \par

## 2021-11-01 NOTE — ASSESSMENT
[Diabetes Foot Care] : diabetes foot care [Long Term Vascular Complications] : long term vascular complications of diabetes [Carbohydrate Consistent Diet] : carbohydrate consistent diet [Importance of Diet and Exercise] : importance of diet and exercise to improve glycemic control, achieve weight loss and improve cardiovascular health [Hypoglycemia Management] : hypoglycemia management [Action and use of Insulin] : action and use of short and long-acting insulin [Self Monitoring of Blood Glucose] : self monitoring of blood glucose [Injection Technique, Storage, Sharps Disposal] : injection technique, storage, and sharps disposal [Retinopathy Screening] : Patient was referred to ophthalmology for retinopathy screening [FreeTextEntry1] : INSTRUCTED TO CONTINUE LANTUS 12 UNITS QHS\par CONTINUE BREAKFAST METFORMIN 500 MG DAILY\par INCREASE DINNER TIME METFORMIN FROM 500 TO 1,000 MG \par .INSTRUCTED TO CALL OFFICE FOR BG < 70, > 250 OR FOR ANY QUESTIONS OR CONCERNS\par EDUCATED ON CONSISTENT CARB DIET

## 2021-11-12 RX ORDER — INSULIN GLARGINE 100 [IU]/ML
100 INJECTION, SOLUTION SUBCUTANEOUS
Qty: 1 | Refills: 3 | Status: COMPLETED | COMMUNITY
Start: 2021-10-29 | End: 2021-11-12

## 2021-12-02 ENCOUNTER — RX RENEWAL (OUTPATIENT)
Age: 76
End: 2021-12-02

## 2022-02-04 ENCOUNTER — RX RENEWAL (OUTPATIENT)
Age: 77
End: 2022-02-04

## 2022-02-14 ENCOUNTER — RESULT CHARGE (OUTPATIENT)
Age: 77
End: 2022-02-14

## 2022-02-14 ENCOUNTER — APPOINTMENT (OUTPATIENT)
Dept: FAMILY MEDICINE | Facility: CLINIC | Age: 77
End: 2022-02-14
Payer: SELF-PAY

## 2022-02-14 VITALS
OXYGEN SATURATION: 100 % | DIASTOLIC BLOOD PRESSURE: 87 MMHG | HEART RATE: 52 BPM | SYSTOLIC BLOOD PRESSURE: 142 MMHG | WEIGHT: 156 LBS | BODY MASS INDEX: 24.07 KG/M2 | TEMPERATURE: 96.9 F | RESPIRATION RATE: 14 BRPM

## 2022-02-14 PROCEDURE — 83036 HEMOGLOBIN GLYCOSYLATED A1C: CPT | Mod: QW

## 2022-02-14 PROCEDURE — 99213 OFFICE O/P EST LOW 20 MIN: CPT | Mod: 25

## 2022-02-14 NOTE — PHYSICAL EXAM
[No Acute Distress] : no acute distress [Well Nourished] : well nourished [Well Developed] : well developed [Normal Sclera/Conjunctiva] : normal sclera/conjunctiva [EOMI] : extraocular movements intact [No Respiratory Distress] : no respiratory distress  [No Accessory Muscle Use] : no accessory muscle use [Clear to Auscultation] : lungs were clear to auscultation bilaterally [Normal Rate] : normal rate  [Regular Rhythm] : with a regular rhythm [Normal S1, S2] : normal S1 and S2 [No Edema] : there was no peripheral edema [No Focal Deficits] : no focal deficits [Normal Gait] : normal gait [Speech Grossly Normal] : speech grossly normal [Normal Affect] : the affect was normal [Alert and Oriented x3] : oriented to person, place, and time [Normal Insight/Judgement] : insight and judgment were intact

## 2022-02-14 NOTE — ASSESSMENT
[FreeTextEntry1] : 75 yo M with PMH diabetes and hyperlipidemia presents for a follow-up visit. \par \par # diabetes\par diagnosed with a1c 14% on 10/29/21\par started on insulin 10/25, and kentrell sensor was placed\par was on metformin and lantus, lantus d/c in November\par Taking Metformin 1000 mg BID\par \par He has greatly changed his diet removing many carbohydrates in the last 4 months.\par However, reports that last week he had an increase in carb intake \par \par Also reports 2-3 episodes of the sensor alerting his FSG went below 75 during sleep\par Will continue to monitor and consider decreasing nighttime metformin from 1000 to 500mg\par \par No chest pain, headache, or dizziness. \par POCT a1c was 5.5 today \par \par has opthalmology appointment scheduled for next month \par given lab work to check a1c, kidney function, and repeat lipid panel\par \par #hyperlipidemia\par elevated triglycerides and LDL and non-HDL cholesterol\par and reviewed foods to look out for \par due for repeat labs, given updated lipid panel script\par \par #depression\par wife passed away at home 2 weeks ago \par advised to seek help if he feels his mood worsens or feels unsafe\par feels he is supported by family and his Rabbi

## 2022-02-14 NOTE — REVIEW OF SYSTEMS
[Vision Problems] : vision problems [Fever] : no fever [Chills] : no chills [Recent Change In Weight] : ~T no recent weight change [Discharge] : no discharge [Chest Pain] : no chest pain [Palpitations] : no palpitations [Lower Ext Edema] : no lower extremity edema [Shortness Of Breath] : no shortness of breath [Wheezing] : no wheezing [Constipation] : no constipation [Diarrhea] : no diarrhea [Dysuria] : no dysuria [Incontinence] : no incontinence [Hesitancy] : no hesitancy [Hematuria] : no hematuria [Unsteady Walk] : no ataxia [FreeTextEntry3] : has eye appt next week

## 2022-02-14 NOTE — HISTORY OF PRESENT ILLNESS
[FreeTextEntry1] : 75 yo M with PMH diabetes and hyperlipidemia presents for a follow-up visit.  [de-identified] : 75 yo M with PMH diabetes and hyperlipidemia presents for a follow-up visit. \par \par # diabetes\par diagnosed with a1c 14% on 10/29/21\par started on insulin 10/25, and kentrell sensor was placed\par was on metformin and lantus, lantus d/c in November\par Taking Metformin 1000 mg BID\par \par He has greatly changed his diet removing many carbohydrates in the last 4 months.\par However, reports that last week he had an increase in carb intake \par \par Also reports 2-3 episodes of the sensor alerting his FSG went below 75 during sleep\par feb 12 around 2 am was  72\par feb 9 around 3am was 57, ate a tangerine and FSG improved\par \par No chest pain, headache, or dizziness. \par due for repeat a1c lab today \par \par has opthalmology appointment scheduled for next month \par \par #hyperlipidemia\par elevated triglycerides and LDL and non-HDL cholesterol\par and reviewed foods to look out for \par due for repeat labs\par \par #depression\par wife passed away at home 2 weeks ago \par advised to seek help if he feels his mood worsens or feels unsafe\par feels he is supported by family and his Rabbi

## 2022-02-16 LAB
ALBUMIN SERPL ELPH-MCNC: 4.7 G/DL
ALP BLD-CCNC: 68 U/L
ALT SERPL-CCNC: 23 U/L
ANION GAP SERPL CALC-SCNC: 15 MMOL/L
AST SERPL-CCNC: 19 U/L
BILIRUB SERPL-MCNC: 0.3 MG/DL
BUN SERPL-MCNC: 23 MG/DL
CALCIUM SERPL-MCNC: 10.3 MG/DL
CHLORIDE SERPL-SCNC: 103 MMOL/L
CHOLEST SERPL-MCNC: 171 MG/DL
CO2 SERPL-SCNC: 23 MMOL/L
CREAT SERPL-MCNC: 1.5 MG/DL
ESTIMATED AVERAGE GLUCOSE: 108 MG/DL
GLUCOSE SERPL-MCNC: 126 MG/DL
HBA1C MFR BLD HPLC: 5.4 %
HDLC SERPL-MCNC: 47 MG/DL
LDLC SERPL CALC-MCNC: 93 MG/DL
NONHDLC SERPL-MCNC: 124 MG/DL
POTASSIUM SERPL-SCNC: 4.1 MMOL/L
PROT SERPL-MCNC: 7.1 G/DL
SODIUM SERPL-SCNC: 141 MMOL/L
TRIGL SERPL-MCNC: 153 MG/DL

## 2022-03-25 ENCOUNTER — NON-APPOINTMENT (OUTPATIENT)
Age: 77
End: 2022-03-25

## 2022-05-20 RX ORDER — FLASH GLUCOSE SCANNING READER
EACH MISCELLANEOUS
Qty: 1 | Refills: 0 | Status: ACTIVE | COMMUNITY
Start: 2022-05-20 | End: 1900-01-01

## 2022-06-27 ENCOUNTER — NON-APPOINTMENT (OUTPATIENT)
Age: 77
End: 2022-06-27

## 2022-07-05 ENCOUNTER — NON-APPOINTMENT (OUTPATIENT)
Age: 77
End: 2022-07-05

## 2022-07-17 ENCOUNTER — RX RENEWAL (OUTPATIENT)
Age: 77
End: 2022-07-17

## 2022-09-14 ENCOUNTER — NON-APPOINTMENT (OUTPATIENT)
Age: 77
End: 2022-09-14

## 2022-09-14 RX ORDER — METFORMIN HYDROCHLORIDE 1000 MG/1
1000 TABLET, COATED ORAL
Qty: 90 | Refills: 3 | Status: COMPLETED | COMMUNITY
Start: 2021-10-26 | End: 2022-09-14

## 2022-11-04 LAB
ALBUMIN SERPL ELPH-MCNC: 4.6 G/DL
ALP BLD-CCNC: 72 U/L
ALT SERPL-CCNC: 17 U/L
ANION GAP SERPL CALC-SCNC: 13 MMOL/L
APPEARANCE: CLEAR
AST SERPL-CCNC: 17 U/L
BACTERIA: NEGATIVE
BASOPHILS # BLD AUTO: 0.04 K/UL
BASOPHILS NFR BLD AUTO: 0.6 %
BILIRUB SERPL-MCNC: 0.4 MG/DL
BILIRUBIN URINE: NEGATIVE
BLOOD URINE: NEGATIVE
BUN SERPL-MCNC: 17 MG/DL
CALCIUM SERPL-MCNC: 10.1 MG/DL
CHLORIDE SERPL-SCNC: 105 MMOL/L
CHOLEST SERPL-MCNC: 156 MG/DL
CO2 SERPL-SCNC: 24 MMOL/L
COLOR: YELLOW
CREAT SERPL-MCNC: 1.42 MG/DL
CREAT SPEC-SCNC: 240 MG/DL
EGFR: 52 ML/MIN/1.73M2
EOSINOPHIL # BLD AUTO: 0.25 K/UL
EOSINOPHIL NFR BLD AUTO: 3.9 %
ESTIMATED AVERAGE GLUCOSE: 117 MG/DL
GLUCOSE QUALITATIVE U: NEGATIVE
GLUCOSE SERPL-MCNC: 118 MG/DL
HBA1C MFR BLD HPLC: 5.7 %
HCT VFR BLD CALC: 36.6 %
HDLC SERPL-MCNC: 45 MG/DL
HGB BLD-MCNC: 13.1 G/DL
HYALINE CASTS: 0 /LPF
IMM GRANULOCYTES NFR BLD AUTO: 0.3 %
KETONES URINE: NEGATIVE
LDLC SERPL CALC-MCNC: 93 MG/DL
LEUKOCYTE ESTERASE URINE: NEGATIVE
LYMPHOCYTES # BLD AUTO: 1.41 K/UL
LYMPHOCYTES NFR BLD AUTO: 21.7 %
MAN DIFF?: NORMAL
MCHC RBC-ENTMCNC: 31.5 PG
MCHC RBC-ENTMCNC: 35.8 GM/DL
MCV RBC AUTO: 88 FL
MICROALBUMIN 24H UR DL<=1MG/L-MCNC: 41 MG/DL
MICROALBUMIN/CREAT 24H UR-RTO: 171 MG/G
MICROSCOPIC-UA: NORMAL
MONOCYTES # BLD AUTO: 0.61 K/UL
MONOCYTES NFR BLD AUTO: 9.4 %
NEUTROPHILS # BLD AUTO: 4.16 K/UL
NEUTROPHILS NFR BLD AUTO: 64.1 %
NITRITE URINE: NEGATIVE
NONHDLC SERPL-MCNC: 111 MG/DL
PH URINE: 6
PLATELET # BLD AUTO: 216 K/UL
POTASSIUM SERPL-SCNC: 4.3 MMOL/L
PROT SERPL-MCNC: 7.1 G/DL
PROTEIN URINE: ABNORMAL
RBC # BLD: 4.16 M/UL
RBC # FLD: 13 %
RED BLOOD CELLS URINE: 2 /HPF
SODIUM SERPL-SCNC: 142 MMOL/L
SPECIFIC GRAVITY URINE: 1.03
SQUAMOUS EPITHELIAL CELLS: 0 /HPF
TRIGL SERPL-MCNC: 90 MG/DL
TSH SERPL-ACNC: 2.06 UIU/ML
UROBILINOGEN URINE: ABNORMAL
WBC # FLD AUTO: 6.49 K/UL
WHITE BLOOD CELLS URINE: 1 /HPF

## 2022-11-13 ENCOUNTER — RX RENEWAL (OUTPATIENT)
Age: 77
End: 2022-11-13

## 2022-11-21 ENCOUNTER — APPOINTMENT (OUTPATIENT)
Dept: FAMILY MEDICINE | Facility: CLINIC | Age: 77
End: 2022-11-21

## 2022-11-21 VITALS
DIASTOLIC BLOOD PRESSURE: 79 MMHG | HEIGHT: 67 IN | TEMPERATURE: 96.9 F | WEIGHT: 152 LBS | RESPIRATION RATE: 14 BRPM | SYSTOLIC BLOOD PRESSURE: 127 MMHG | OXYGEN SATURATION: 98 % | BODY MASS INDEX: 23.86 KG/M2 | HEART RATE: 82 BPM

## 2022-11-21 DIAGNOSIS — F43.21 ADJUSTMENT DISORDER WITH DEPRESSED MOOD: ICD-10-CM

## 2022-11-21 DIAGNOSIS — Z78.9 OTHER SPECIFIED HEALTH STATUS: ICD-10-CM

## 2022-11-21 DIAGNOSIS — R73.09 OTHER ABNORMAL GLUCOSE: ICD-10-CM

## 2022-11-21 PROCEDURE — 99397 PER PM REEVAL EST PAT 65+ YR: CPT

## 2022-11-21 RX ORDER — BLOOD PRESSURE TEST KIT-MEDIUM
KIT MISCELLANEOUS
Qty: 1 | Refills: 0 | Status: ACTIVE | COMMUNITY
Start: 2022-11-21 | End: 1900-01-01

## 2022-11-27 PROBLEM — Z78.9 DOES NOT USE ILLICIT DRUGS: Status: ACTIVE | Noted: 2022-11-27

## 2022-11-27 PROBLEM — F43.21 GRIEF: Status: ACTIVE | Noted: 2022-11-27

## 2022-11-27 PROBLEM — R73.09 ELEVATED GLUCOSE: Status: RESOLVED | Noted: 2021-10-25 | Resolved: 2022-11-27

## 2022-11-27 NOTE — PHYSICAL EXAM
[Normal] : affect was normal and insight and judgment were intact [de-identified] : no suicidal ideation or intent to self-harm

## 2022-11-27 NOTE — HISTORY OF PRESENT ILLNESS
[de-identified] : 77 year old male presents for health maintenance and physical exam. He has been grieving this year as his wife, Elizabeth, passed away in May from cancer. There has been a lot in his life as he is now selling his huge house where he lived for last 43 years. He states that he feels a "tidal wave of emotions", but feels he has a lot to live for. He also had an added stressor this year as his twin brother, Gabriel, contracted Covid pneumonia while battling Stage 4 colorectal cancer at [facility], diagnosed at age 72. He was on a ventilator and dialysis in [place]. Patient notes that he has difficulty with sleep, applies many sleep hygiene efforts.\par \par Patient says his diet has become more limited after his wife's passing, as he eats cereal in morning, salad for lunch, but dinner is variable and tougher to maintain. He does not cook. He does barbecue over the summer. He has good social support with his family. PMH of prostate cancer at 67, treated with surgery and chemotherapy for 6 rounds, likely Cisplatin based at [facility]. Last colonoscopy with Dr. Trip Carroll 2 years ago.\par \par BP today is normotensive. Urine lab work shows microalbuminuria. Well controlled HbA1c at 5.7%. Creatinine of 1.42 and eGFR of 52 places him at CKD stage 3a.

## 2023-02-28 RX ORDER — FLASH GLUCOSE SENSOR
KIT MISCELLANEOUS
Qty: 2 | Refills: 5 | Status: ACTIVE | COMMUNITY
Start: 2023-02-28 | End: 1900-01-01

## 2023-02-28 RX ORDER — BLOOD-GLUCOSE SENSOR
EACH MISCELLANEOUS
Qty: 2 | Refills: 3 | Status: COMPLETED | COMMUNITY
Start: 2021-11-05 | End: 2023-02-28

## 2023-08-22 ENCOUNTER — NON-APPOINTMENT (OUTPATIENT)
Age: 78
End: 2023-08-22

## 2023-09-07 ENCOUNTER — RX RENEWAL (OUTPATIENT)
Age: 78
End: 2023-09-07

## 2023-09-07 ENCOUNTER — APPOINTMENT (OUTPATIENT)
Dept: FAMILY MEDICINE | Facility: CLINIC | Age: 78
End: 2023-09-07
Payer: SELF-PAY

## 2023-09-07 VITALS
DIASTOLIC BLOOD PRESSURE: 69 MMHG | HEART RATE: 58 BPM | WEIGHT: 158 LBS | RESPIRATION RATE: 14 BRPM | SYSTOLIC BLOOD PRESSURE: 159 MMHG | OXYGEN SATURATION: 96 % | TEMPERATURE: 98.4 F | BODY MASS INDEX: 24.75 KG/M2

## 2023-09-07 PROCEDURE — 99214 OFFICE O/P EST MOD 30 MIN: CPT

## 2023-09-07 NOTE — HISTORY OF PRESENT ILLNESS
[FreeTextEntry8] : extreme anxiety adn high bp twin brother Gabriel in hospital with stage 4 cancer, having second whipple procedure, visits him every day, had recent MI will start valsartan 80 forgo anxiety med

## 2023-09-28 ENCOUNTER — NON-APPOINTMENT (OUTPATIENT)
Age: 78
End: 2023-09-28

## 2023-10-11 LAB
ANION GAP SERPL CALC-SCNC: 12 MMOL/L
ANION GAP SERPL CALC-SCNC: 14 MMOL/L
BUN SERPL-MCNC: 16 MG/DL
BUN SERPL-MCNC: 19 MG/DL
CALCIUM SERPL-MCNC: 10.1 MG/DL
CALCIUM SERPL-MCNC: 9.8 MG/DL
CHLORIDE SERPL-SCNC: 104 MMOL/L
CHLORIDE SERPL-SCNC: 105 MMOL/L
CO2 SERPL-SCNC: 22 MMOL/L
CO2 SERPL-SCNC: 25 MMOL/L
CREAT SERPL-MCNC: 1.4 MG/DL
CREAT SERPL-MCNC: 1.46 MG/DL
EGFR: 50 ML/MIN/1.73M2
EGFR: 52 ML/MIN/1.73M2
GLUCOSE SERPL-MCNC: 108 MG/DL
GLUCOSE SERPL-MCNC: 138 MG/DL
POTASSIUM SERPL-SCNC: 4.6 MMOL/L
POTASSIUM SERPL-SCNC: 5 MMOL/L
SODIUM SERPL-SCNC: 140 MMOL/L
SODIUM SERPL-SCNC: 141 MMOL/L

## 2023-12-01 ENCOUNTER — RX RENEWAL (OUTPATIENT)
Age: 78
End: 2023-12-01

## 2024-01-18 ENCOUNTER — APPOINTMENT (OUTPATIENT)
Dept: FAMILY MEDICINE | Facility: CLINIC | Age: 79
End: 2024-01-18
Payer: SELF-PAY

## 2024-01-18 VITALS
TEMPERATURE: 98.1 F | SYSTOLIC BLOOD PRESSURE: 111 MMHG | DIASTOLIC BLOOD PRESSURE: 69 MMHG | RESPIRATION RATE: 14 BRPM | HEART RATE: 61 BPM | HEIGHT: 67 IN | BODY MASS INDEX: 24.96 KG/M2 | WEIGHT: 159 LBS | OXYGEN SATURATION: 98 %

## 2024-01-18 DIAGNOSIS — I10 ESSENTIAL (PRIMARY) HYPERTENSION: ICD-10-CM

## 2024-01-18 DIAGNOSIS — G62.9 POLYNEUROPATHY, UNSPECIFIED: ICD-10-CM

## 2024-01-18 PROCEDURE — 99214 OFFICE O/P EST MOD 30 MIN: CPT

## 2024-01-20 PROBLEM — G62.9 PERIPHERAL NEUROPATHY: Status: ACTIVE | Noted: 2024-01-18

## 2024-01-20 PROBLEM — I10 ESSENTIAL HYPERTENSION, BENIGN: Status: ACTIVE | Noted: 2023-09-07

## 2024-03-12 ENCOUNTER — NON-APPOINTMENT (OUTPATIENT)
Age: 79
End: 2024-03-12

## 2024-03-12 LAB
ALBUMIN SERPL ELPH-MCNC: 4.9 G/DL
ALP BLD-CCNC: 76 U/L
ALT SERPL-CCNC: 19 U/L
ANION GAP SERPL CALC-SCNC: 14 MMOL/L
AST SERPL-CCNC: 17 U/L
BILIRUB SERPL-MCNC: 0.4 MG/DL
BUN SERPL-MCNC: 19 MG/DL
CALCIUM SERPL-MCNC: 10 MG/DL
CHLORIDE SERPL-SCNC: 104 MMOL/L
CHOLEST SERPL-MCNC: 177 MG/DL
CO2 SERPL-SCNC: 23 MMOL/L
CREAT SERPL-MCNC: 1.54 MG/DL
CREAT SPEC-SCNC: 115 MG/DL
EGFR: 46 ML/MIN/1.73M2
ESTIMATED AVERAGE GLUCOSE: 126 MG/DL
GLUCOSE SERPL-MCNC: 95 MG/DL
HBA1C MFR BLD HPLC: 6 %
HCT VFR BLD CALC: 43.5 %
HDLC SERPL-MCNC: 45 MG/DL
HGB BLD-MCNC: 14.4 G/DL
LDLC SERPL CALC-MCNC: 105 MG/DL
MCHC RBC-ENTMCNC: 29.4 PG
MCHC RBC-ENTMCNC: 33.1 GM/DL
MCV RBC AUTO: 88.8 FL
MICROALBUMIN 24H UR DL<=1MG/L-MCNC: 6.3 MG/DL
MICROALBUMIN/CREAT 24H UR-RTO: 54 MG/G
NONHDLC SERPL-MCNC: 132 MG/DL
PLATELET # BLD AUTO: 263 K/UL
POTASSIUM SERPL-SCNC: 4.8 MMOL/L
PROT SERPL-MCNC: 8 G/DL
RBC # BLD: 4.9 M/UL
RBC # FLD: 14.3 %
SODIUM SERPL-SCNC: 141 MMOL/L
TRIGL SERPL-MCNC: 149 MG/DL
TSH SERPL-ACNC: 2.01 UIU/ML
WBC # FLD AUTO: 6.94 K/UL

## 2024-03-12 NOTE — HISTORY OF PRESENT ILLNESS
[de-identified] : 78-year-old male presents for ongoing management of chronic medical conditions. He describes worsening neuropathy, which has been impacting the quality of his sleep. Patient has a history of diabetes and has been experiencing notable weight gain. There have been worsening signs of neuropathy, impacting his sleep pattern. He has also been suffering from dental issues due to staph infection requiring extraction of bones and pins. He still sees his brother who has a complex medical history, including pancreatic cancer, heart attack, and complicated bypass surgery. His brother had multiple Whipple procedures for which he has supported his brother through. He recently returned from [facility] on an expedition.

## 2024-03-12 NOTE — PHYSICAL EXAM
[Normal] : normal rate, regular rhythm, normal S1 and S2 and no murmur heard [None] : no ulcers in either foot were found [] : both feet [___] : left foot points [unfilled] [TWNoteComboBox3] : +1 [TWNoteComboBox5] : False [TWNoteComboBox4] : +1 [TWNoteComboBox6] : False

## 2024-05-31 ENCOUNTER — RX RENEWAL (OUTPATIENT)
Age: 79
End: 2024-05-31

## 2024-05-31 RX ORDER — METFORMIN HYDROCHLORIDE 500 MG/1
500 TABLET, COATED ORAL
Qty: 60 | Refills: 1 | Status: ACTIVE | COMMUNITY
Start: 2021-12-02 | End: 1900-01-01

## 2024-06-04 DIAGNOSIS — N18.31 CHRONIC KIDNEY DISEASE, STAGE 3A: ICD-10-CM

## 2024-06-05 LAB
ALBUMIN SERPL ELPH-MCNC: 4.5 G/DL
ALP BLD-CCNC: 75 U/L
ALT SERPL-CCNC: 25 U/L
ANION GAP SERPL CALC-SCNC: 12 MMOL/L
AST SERPL-CCNC: 19 U/L
BILIRUB SERPL-MCNC: 0.4 MG/DL
BUN SERPL-MCNC: 23 MG/DL
CALCIUM SERPL-MCNC: 9.6 MG/DL
CHLORIDE SERPL-SCNC: 103 MMOL/L
CHOLEST SERPL-MCNC: 179 MG/DL
CO2 SERPL-SCNC: 22 MMOL/L
CREAT SERPL-MCNC: 1.41 MG/DL
EGFR: 51 ML/MIN/1.73M2
ESTIMATED AVERAGE GLUCOSE: 134 MG/DL
GLUCOSE SERPL-MCNC: 106 MG/DL
HBA1C MFR BLD HPLC: 6.3 %
HCT VFR BLD CALC: 36.6 %
HDLC SERPL-MCNC: 39 MG/DL
HGB BLD-MCNC: 13.1 G/DL
LDLC SERPL CALC-MCNC: 110 MG/DL
MCHC RBC-ENTMCNC: 30.9 PG
MCHC RBC-ENTMCNC: 35.8 GM/DL
MCV RBC AUTO: 86.3 FL
NONHDLC SERPL-MCNC: 140 MG/DL
PLATELET # BLD AUTO: 234 K/UL
POTASSIUM SERPL-SCNC: 4.6 MMOL/L
PROT SERPL-MCNC: 7.4 G/DL
RBC # BLD: 4.24 M/UL
RBC # FLD: 14.6 %
SODIUM SERPL-SCNC: 137 MMOL/L
TRIGL SERPL-MCNC: 174 MG/DL
TSH SERPL-ACNC: 1.74 UIU/ML
WBC # FLD AUTO: 6.06 K/UL

## 2024-06-06 LAB
CREAT SPEC-SCNC: 175 MG/DL
MICROALBUMIN 24H UR DL<=1MG/L-MCNC: 4.2 MG/DL
MICROALBUMIN/CREAT 24H UR-RTO: 24 MG/G

## 2024-06-13 ENCOUNTER — APPOINTMENT (OUTPATIENT)
Dept: FAMILY MEDICINE | Facility: CLINIC | Age: 79
End: 2024-06-13
Payer: SELF-PAY

## 2024-06-13 VITALS
SYSTOLIC BLOOD PRESSURE: 113 MMHG | BODY MASS INDEX: 25.37 KG/M2 | WEIGHT: 162 LBS | RESPIRATION RATE: 14 BRPM | DIASTOLIC BLOOD PRESSURE: 68 MMHG | OXYGEN SATURATION: 96 % | HEART RATE: 51 BPM | TEMPERATURE: 98.1 F

## 2024-06-13 DIAGNOSIS — E11.65 TYPE 2 DIABETES MELLITUS WITH HYPERGLYCEMIA: ICD-10-CM

## 2024-06-13 DIAGNOSIS — E78.5 HYPERLIPIDEMIA, UNSPECIFIED: ICD-10-CM

## 2024-06-13 DIAGNOSIS — F41.8 OTHER SPECIFIED ANXIETY DISORDERS: ICD-10-CM

## 2024-06-13 DIAGNOSIS — F32.9 MAJOR DEPRESSIVE DISORDER, SINGLE EPISODE, UNSPECIFIED: ICD-10-CM

## 2024-06-13 PROCEDURE — 99214 OFFICE O/P EST MOD 30 MIN: CPT

## 2024-06-13 PROCEDURE — G2211 COMPLEX E/M VISIT ADD ON: CPT

## 2024-06-13 RX ORDER — VALSARTAN 80 MG/1
80 TABLET, COATED ORAL DAILY
Qty: 90 | Refills: 3 | Status: ACTIVE | COMMUNITY
Start: 2023-09-07 | End: 1900-01-01

## 2024-06-13 RX ORDER — GABAPENTIN 100 MG/1
100 CAPSULE ORAL 3 TIMES DAILY
Qty: 90 | Refills: 3 | Status: ACTIVE | COMMUNITY
Start: 2024-01-18 | End: 1900-01-01

## 2024-06-16 PROBLEM — E11.65 UNCONTROLLED TYPE 2 DIABETES MELLITUS WITH HYPERGLYCEMIA: Status: ACTIVE | Noted: 2021-10-26

## 2024-06-16 PROBLEM — F32.9 REACTIVE DEPRESSION: Status: RESOLVED | Noted: 2021-10-25 | Resolved: 2024-06-16

## 2024-06-16 PROBLEM — E78.5 HYPERLIPIDEMIA: Status: ACTIVE | Noted: 2022-02-14

## 2024-06-16 PROBLEM — F41.8 DEPRESSION WITH ANXIETY: Status: ACTIVE | Noted: 2023-09-13

## 2024-06-16 NOTE — PHYSICAL EXAM
[Normal] : affect was normal and insight and judgment were intact [Right Foot Was Examined] : Right foot ~C was examined [Left Foot Was Examined] : left foot ~C was examined [None] : no ulcers in either foot were found [] : with full ROM [TWNoteComboBox3] : +2

## 2024-06-16 NOTE — HISTORY OF PRESENT ILLNESS
[de-identified] : 79-year-old male presents for ongoing management of his chronic medical conditions, including diabetes and cardiovascular disease. His A1C level had increased to 6.3%, which was higher than his previous reading. He expressed concern about the increase despite being careful with his diet. His cardiologist had scheduled him for an echocardiogram and a stress test due to concerns about his cardiovascular health. He mentioned having difficulty finding a pulse in his right foot during a previous visit. Mr. Ponce is an avid traveler and has visited 142 countries and seven continents. He is planning a trip to the [place] on July 9th. He enjoys an active lifestyle and has a positive outlook on life.

## 2024-06-20 ENCOUNTER — APPOINTMENT (OUTPATIENT)
Dept: CT IMAGING | Facility: CLINIC | Age: 79
End: 2024-06-20
Payer: SELF-PAY

## 2024-06-20 ENCOUNTER — OUTPATIENT (OUTPATIENT)
Dept: OUTPATIENT SERVICES | Facility: HOSPITAL | Age: 79
LOS: 1 days | End: 2024-06-20
Payer: SELF-PAY

## 2024-06-20 DIAGNOSIS — Z82.49 FAMILY HISTORY OF ISCHEMIC HEART DISEASE AND OTHER DISEASES OF THE CIRCULATORY SYSTEM: ICD-10-CM

## 2024-06-20 DIAGNOSIS — E78.2 MIXED HYPERLIPIDEMIA: ICD-10-CM

## 2024-06-20 PROCEDURE — 75571 CT HRT W/O DYE W/CA TEST: CPT | Mod: 26

## 2024-06-20 PROCEDURE — 75571 CT HRT W/O DYE W/CA TEST: CPT

## 2024-07-12 NOTE — ED ADULT NURSE NOTE - BRAND OF COVID-19 VACCINATION
Caller: DILAN JOVEL    Relationship to patient: Emergency Contact    Best call back number: [phone]     PATIENT'S WIFE STATES THAT HE HAS BEEN HAVING CONSTIPATION WITH A LITTLE DRIBBLE OF DIARRHEA.  SHE IS WANTING TO KNOW WHAT HE COULD TAKE OR DO FOR THIS?   Moderna dose 1, 2, and 3

## 2024-12-09 ENCOUNTER — NON-APPOINTMENT (OUTPATIENT)
Age: 79
End: 2024-12-09

## 2024-12-16 ENCOUNTER — RX RENEWAL (OUTPATIENT)
Age: 79
End: 2024-12-16

## 2025-01-21 ENCOUNTER — RX RENEWAL (OUTPATIENT)
Age: 80
End: 2025-01-21

## 2025-05-13 ENCOUNTER — RX RENEWAL (OUTPATIENT)
Age: 80
End: 2025-05-13